# Patient Record
Sex: FEMALE | Race: WHITE | Employment: PART TIME
[De-identification: names, ages, dates, MRNs, and addresses within clinical notes are randomized per-mention and may not be internally consistent; named-entity substitution may affect disease eponyms.]

---

## 2017-07-10 ENCOUNTER — HISTORIC RESULTS (OUTPATIENT)
Dept: ADMINISTRATIVE | Age: 52
End: 2017-07-10

## 2017-11-14 ENCOUNTER — HISTORIC RESULTS (OUTPATIENT)
Dept: ADMINISTRATIVE | Age: 52
End: 2017-11-14

## 2019-12-20 ENCOUNTER — HOSPITAL ENCOUNTER (EMERGENCY)
Facility: CLINIC | Age: 54
Discharge: HOME | End: 2019-12-20
Attending: EMERGENCY MEDICINE | Admitting: EMERGENCY MEDICINE
Payer: COMMERCIAL

## 2019-12-20 VITALS
BODY MASS INDEX: 31.39 KG/M2 | WEIGHT: 200 LBS | RESPIRATION RATE: 16 BRPM | TEMPERATURE: 98 F | DIASTOLIC BLOOD PRESSURE: 92 MMHG | SYSTOLIC BLOOD PRESSURE: 164 MMHG | HEIGHT: 67 IN | OXYGEN SATURATION: 100 %

## 2019-12-20 DIAGNOSIS — N61.0 BREAST INFLAMMATION: ICD-10-CM

## 2019-12-20 DIAGNOSIS — N64.4 BREAST TENDERNESS: ICD-10-CM

## 2019-12-20 PROCEDURE — 99282 EMERGENCY DEPT VISIT SF MDM: CPT | Performed by: EMERGENCY MEDICINE

## 2019-12-20 PROCEDURE — 99283 EMERGENCY DEPT VISIT LOW MDM: CPT | Mod: Z6 | Performed by: EMERGENCY MEDICINE

## 2019-12-20 RX ORDER — SENNOSIDES 8.6 MG
1300 CAPSULE ORAL EVERY MORNING
COMMUNITY
Start: 2018-10-03

## 2019-12-20 RX ORDER — METOPROLOL SUCCINATE 50 MG/1
50 TABLET, EXTENDED RELEASE ORAL 2 TIMES DAILY
COMMUNITY
Start: 2019-12-12

## 2019-12-20 RX ORDER — METRONIDAZOLE 7.5 MG/G
1 GEL VAGINAL
COMMUNITY
Start: 2019-06-27

## 2019-12-20 RX ORDER — BUPROPION HYDROCHLORIDE 75 MG/1
75 TABLET ORAL EVERY MORNING
COMMUNITY
Start: 2019-12-12

## 2019-12-20 RX ORDER — SULFAMETHOXAZOLE/TRIMETHOPRIM 800-160 MG
1 TABLET ORAL 2 TIMES DAILY
COMMUNITY
Start: 2019-12-12

## 2019-12-20 RX ORDER — FLUCONAZOLE 150 MG/1
150 TABLET ORAL DAILY PRN
COMMUNITY
Start: 2019-12-17

## 2019-12-20 RX ORDER — LACTOBACILLUS RHAMNOSUS GG 10B CELL
1 CAPSULE ORAL DAILY
COMMUNITY

## 2019-12-20 ASSESSMENT — ENCOUNTER SYMPTOMS
WOUND: 1
SHORTNESS OF BREATH: 0
ARTHRALGIAS: 1
CHILLS: 0
HEADACHES: 0
NAUSEA: 0
NECK STIFFNESS: 0
DYSURIA: 0
VOMITING: 0
FEVER: 0
ABDOMINAL PAIN: 1
DIARRHEA: 0
DIFFICULTY URINATING: 0
COUGH: 0
LIGHT-HEADEDNESS: 1
ABDOMINAL DISTENTION: 0

## 2019-12-20 ASSESSMENT — MIFFLIN-ST. JEOR: SCORE: 1534.82

## 2019-12-20 NOTE — ED AVS SNAPSHOT
[facility] Emergency Department  [address] [address]  Phone:  [phone]  Fax:  [phone]                                    Albania Osborne   MRN: [id number]    Department:  [facility] Emergency Department   Date of Visit:  12/20/2019           After Visit Summary Signature Page    I have received my discharge instructions, and my questions have been answered. I have discussed any challenges I see with this plan with the nurse or doctor.    ..........................................................................................................................................  Patient/Patient Representative Signature      ..........................................................................................................................................  Patient Representative Print Name and Relationship to Patient    ..................................................               ................................................  Date                                   Time    ..........................................................................................................................................  Reviewed by Signature/Title    ...................................................              ..............................................  Date                                               Time          22EPIC Rev 08/18

## 2019-12-21 NOTE — ED NOTES
Right breast abscess. Noticed 9 days ago and has had 2 doctors appointments this week. Was sent to the surgeon who sent her to have a mammogram. Radiologist said it would not need to be drained and would go away on its own. Abscess has since grown and pt thinks it needs to be drained again like it had been in September.

## 2019-12-21 NOTE — DISCHARGE INSTRUCTIONS
Continue to follow-up with your primary care provider and take any necessary actions after results of mammogram are available.  If you develop continued swelling, redness, tenderness, or warmth you want to be reevaluated immediately.  If you develop fevers chills nausea and vomiting you also want to be seen immediately.  You may return to the emergency department for further evaluation and treatment.

## 2019-12-21 NOTE — ED PROVIDER NOTES
History     Chief Complaint   Patient presents with     Post-op Problem     ongoing problems with abcess on breast; has had surgery, concerned about cellulitis     HPI  Albania Osborne is a 54 year old female with history of sleeve gastrectomy (2 years ago), hypertension, osteoarthritis, with ongoing breast issues. Swelling in July, I&D 9/21 size of golf ball, would not completely heal and continued to leak. Started bactrim 9 days ago and stopped draining. Had mammogram today and noticed increased swelling, redness and pain.  No fevers, chills, nausea, vomiting diarrhea, no abdominal pain, or dysuria.  She does became concerned after difficulty with draining initial abscess to have increased redness and swelling.    The patient's PMHx, Surgical Hx, Allergies, and Medications were all reviewed with the patient.    Allergies:  Allergies   Allergen Reactions     Ace Inhibitors Cough     Itchy- scratching surface skin     Hydrochlorothiazide Other (See Comments)     fatigue     Varenicline Other (See Comments)     Raised BP     Azithromycin Rash     Day 3-4 get itching on abd and gets rash     Erythromycin Other (See Comments) and Rash     burning and ringing ears     Latex Rash       Problem List:    There are no active problems to display for this patient.       Past Medical History:    No past medical history on file.    Past Surgical History:    No past surgical history on file.    Family History:    No family history on file.    Social History:  Marital Status:   [2]  Social History     Tobacco Use     Smoking status: Not on file   Substance Use Topics     Alcohol use: Not on file     Drug use: Not on file        Medications:    acetaminophen (TYLENOL) 650 MG CR tablet  buPROPion (WELLBUTRIN) 75 MG tablet  Fish Oil-Krill Oil (KRILL OIL PLUS PO)  lactobacillus rhamnosus, GG, (CULTURELL) capsule  metoprolol succinate ER (TOPROL-XL) 50 MG 24 hr tablet  sulfamethoxazole-trimethoprim (BACTRIM DS/SEPTRA DS)  "800-160 MG tablet  fluconazole (DIFLUCAN) 150 MG tablet  metroNIDAZOLE (METROGEL) 0.75 % vaginal gel          Review of Systems   Constitutional: Negative for chills and fever.   HENT: Negative for congestion.    Eyes: Negative for visual disturbance.   Respiratory: Negative for cough and shortness of breath.    Cardiovascular: Negative for chest pain.   Gastrointestinal: Positive for abdominal pain. Negative for abdominal distention, diarrhea, nausea and vomiting.   Endocrine: Negative for cold intolerance and heat intolerance.   Genitourinary: Negative for difficulty urinating and dysuria.   Musculoskeletal: Positive for arthralgias. Negative for neck stiffness.   Skin: Positive for wound.   Allergic/Immunologic: Negative for environmental allergies, food allergies and immunocompromised state.   Neurological: Positive for light-headedness. Negative for headaches.       Physical Exam   BP: (!) 164/92  Heart Rate: 61  Temp: 98  F (36.7  C)  Resp: 16  Height: 170.2 cm (5' 7\")  Weight: 90.7 kg (200 lb)  SpO2: 100 %    Physical Exam  GEN: Awake, alert, and cooperative. No acute distress, sitting upright on cart  HENT: MMM. External ears and nose normal bilaterally.  EYES: EOM intact. Conjunctiva clear. No discharge.   NECK: Supple, symmetric.  CV : Regular rate and rhythm  BREAST: erythema at 3 O'Clock position abutting areola, with 1 cm lesion abutting from areola.  Fibrocystic changes palpated on both breasts centrally.  PULM: Normal effort. No wheezes, rales, or rhonchi bilaterally.  ABD: Soft, non-tender, non-distended. No rebound or guarding.   NEURO: Normal speech. Following commands. Answering questions and interacting appropriately.   EXT: No gross deformity. Warm and well perfused  INT: Warm. No diaphoresis. Normal color.        ED Course        Procedures           Critical Care time:  none               No results found for this or any previous visit (from the past 24 hour(s)).    Medications - No data to " display    Assessments & Plan (with Medical Decision Making)   54 year old female with past medical history of sleeve gastrectomy, hypertension, osteoarthritis and breast abscess status post I&D on 9/21/2019 here with continued issues relating to abscess site.  Had a mammogram earlier today and afterwards had some increasing redness and swelling.  Is concern for development of abscess.  She is on day 9 of 10 of Bactrim.  On arrival to Emergency Department, vital signs were notable for blood pressure 164/92.  Examination of breast detailed above small focality of erythema abutting edematous lesion on areola.  Point-of-care ultrasound without any hypodensity to suggest a fluid collection which would be amenable to drainage.  Patient stated relief that there does not appear to be abscess collection at this point.  I told her that sometimes it takes time for these to develop and it is certainly possible that an abscess will develop over the next few days and she should monitor closely.  She should get the results of her mammogram in the next few days and then has follow-up plans with her surgeon.  Feel she is appropriate for discharge at this time.  ED return precautions discussed.  Discharged in stable condition.    I have reviewed the nursing notes.         Discharge Medication List as of 12/20/2019 10:43 PM          Final diagnoses:   Breast inflammation   Breast tenderness - s/p I&D for abscess     Jose Powell MD    12/20/2019   [facility] EMERGENCY DEPARTMENT    Disclaimer: This note consists of words and symbols derived from keyboarding and dictation using voice recognition software.  As a result, there may be errors that have gone undetected.  Please consider this when interpreting information found in this note.     Jose Powell MD  12/20/19 7947